# Patient Record
(demographics unavailable — no encounter records)

---

## 2024-10-30 NOTE — PHYSICAL EXAM
[Alert] : alert [Oriented x 3] : ~L oriented x 3 [Well Nourished] : well nourished [Conjunctiva Non-injected] : conjunctiva non-injected [No Visual Lymphadenopathy] : no visual  lymphadenopathy [No Clubbing] : no clubbing [No Edema] : no edema [No Bromhidrosis] : no bromhidrosis [No Chromhidrosis] : no chromhidrosis [Full Body Skin Exam Performed] : performed [FreeTextEntry3] : General: Alert and oriented, in NAD.  All of the following were examined and were within normal limits, except as noted:   Scalp: Face: Neck: Chest/Back/Abdomen: Arms/Hands: Legs/Feet: Buttocks, Genitalia:  	 Hair, Nails, Oral Mucosa, Eyes:   Lymph Nodes:  - on the right zygomatic cheek, there is a well healed surgical scar. No evidence of repigmentation, nodularity, or recurrence - few yellow papules on the forehead and cheeks with excessive oily skin - stuck-on waxy brown papules on chest and abdomen - red papules on body, including R chest - numerous brown to dark brown macules and few papules on torso and extremities  - well healed biopsy scar on left mid-back without recurrence - cystic nodules with central punctum on R upper back x1 and R lateral breast x2 - irregular soft SQ deep nodule on R inferior breast, noted 7:00 position under areola.  - soft tender deep SQ nodule on R. medial breast 3:00 position

## 2024-10-30 NOTE — ASSESSMENT
[FreeTextEntry1] : # Neoplasm of uncertain behavior, R inferior breast (at edge of areola) - present since atleast 5/2024 exam # New lesion R. medial breast, which is possible hematoma i/s/o recent trauma vs other - Recommend further workup with mammogram/ultrasound imaging. We had prior referred to Dr. Zavala; will reach out via email to confirm to areas of concern are different from EIC x2 on R lateral breast  # EIC, R upper back x1, R lateral breast x1, R inferolateral breast x1 - Benign condition discussed with patient - No further treatment indicated at this time  # History of melanoma (Invasive LMM, right zygomatic cheek, s/p Mohs excision 9/2019)  - No evidence of recurrence or repigmentation, no LAD - Denies fevers, chills, weight loss - Recommend to f/u with ophthalmology, dentist and OB-Gyn annually - Discussed mole monitoring and sun protective measures - RTC: 4-6 months   # Sebaceous hyperplasia, face - Benign condition discussed with patient - No further treatment indicated at this time  # Seborrheic Keratosis - These growths are benign - Related to genetics - these lesions run in families; NOT related to sun exposure - No treatment warranted unless inflamed; can use OTC Sarna lotion PRN itch  # Multiple melanocytic nevi, benign  - Monitor moles for ABCDEs - Recommend wearing hats and sun protective clothing or sunscreen (SPF 30+, broad band) daily on your face and entire body (apply sunscreen atleast 30 minutes prior to going outside). Reapply sunscreen every 2 hours when outside.  # Angioma - Benign condition discussed with patient - No further treatment indicated at this time  # Screening exam for skin cancer - TBSE performed today - Advised sun protection, SPF 30 or higher daily and reapply often, sun protective clothing - Counseled patient to monitor for changes, including ABCDEs of mole monitoring - Discussed self-skin exams  # Acne, mild on face - chronic; stable though pt c/o oily skin - Continue OTC BP 3-5% wash daily to face  # Furunculosis - chronic; stable Prev involved bl inframammary folds - Continue antibacterial Hibiclens (or Dial soap) wash daily to torso  # Stasis dermatitis, b/l lower legs - mostly PIH on exam today; chronic; stable - Diagnosis discussed, chronic problem due to decreased blood flow in lower legs  - Recommend compression stockings (knee high) with 15-20 mmHg compression daily - Keep legs elevated when able - Dry skin care  # Wound with crusted erosion, R elbow - Mupirocin ointment BID until healed  RTC 4-6 months

## 2024-10-30 NOTE — HISTORY OF PRESENT ILLNESS
[FreeTextEntry1] : FUV spots; hx of melanoma [de-identified] : Ms. LISA CARAMICO is a 61-year-old F with hx of anxiety and endometrial carcinoma with mets to lymph node (s/p surgery, chemo/radiation in 2/2019) here for followup of below.  #Bump on R inferior breast, noticed around June 2023. Pt saw Dr. Zavala about it, who is monitoring and recommended further imaging.  - 5/1/2024 at  had noted new lump inferior to R areola, referred to Dr. Zavala for eval, but per records known EIC's were evaluated. Patient has since had a fall recently, impacted anterior chest, not with pain in the breast and chest area  #Of note pt notes hx of painful bumps on bl inframammary folds for weeks prior to  that improved with levofloxacin. Also used mupirocin ointment. She notes that she works out a lot and wears tight clothing - 1/7/21: Clear today; using hibiclens wash and mupirocin ointment to nares. Still with scarring.  - 5/2024: stable with hibiclens  #Spots scattered on body x years. Asymptomatic and unchanged. No alleviating/aggravating factors. Never been treated.  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

## 2024-11-14 NOTE — ASSESSMENT
[FreeTextEntry1] : 62-year-old lady with a history of endometrial cancer, stage I right cheek melanoma, and dysplastic nevus of her left back, referred for the evaluation and management of a visible and palpable abnormality in her right breast (8:00) in 2023.  My exam and her imaging suggested an epidermal inclusion cyst.  She presents today because October 30, 2024, a routine dermatologic examination, they felt a possible abnormality in the right breast, 6:00, periareolar. Patient is asymptomatic with a normal BSE. Identified no worrisome findings on physical examination today.  I suggested a diagnostic right mammogram and sonogram presently. She understands and agrees. Prescription provided for .  I have asked her to call me a week after the imaging to discuss the results. She presents for follow-up breast examination today.   Spring 2024 Bilateral mammography and right breast ultrasound at : [facility]. Prescription verified for April 2025.  Presently she appears to be doing well.  I have offered to continue to see her annually, sooner if needed.  Reviewed in detail, all questions answered.

## 2024-11-14 NOTE — PHYSICAL EXAM
[Normal] : supple, no neck mass and thyroid not enlarged [Normal Neck Lymph Nodes] : normal neck lymph nodes  [Normal Supraclavicular Lymph Nodes] : normal supraclavicular lymph nodes [Normal Axillary Lymph Nodes] : normal axillary lymph nodes [Normal] : full range of motion and no deformities appreciated [de-identified] : Groins not examined [de-identified] : Below [de-identified] : Below

## 2024-11-14 NOTE — REASON FOR VISIT
[Other: _____] : [unfilled] [FreeTextEntry2] : Presents for evaluation of a possible palpable abnormality in her right breast, periareolar at 6:00, identified by the dermatologist on skin examination October 30, 2024.

## 2024-11-14 NOTE — HISTORY OF PRESENT ILLNESS
[de-identified] :  62-year-old lady.  Interval breast examination.  CC: She is asymptomatic. 10/30/2024: On schedule dermatology follow-up, they raise a concern regarding a possible palpable abnormality in the right breast, abel-areolar, at 6:00. The patient has a normal BSE. She has no signs or symptoms related to either breast.  No other specific or constitutional signs or symptoms.  + History of epidermal inclusion cyst of the right breast (~8:00, 6 cm FN).   + History of ENDOMETRIAL CANCER (below), and CHEEK MELANOMA (below).  + Prior personal history: 1. 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 2019: Treated with Mohs procedure.  2.  Left mid back dysplastic nevus, excised by dermatology.  3.  2018: CAROLINA/BSO, lymphadenectomy, and omentectomy for ENDOMETRIAL CANCER (stage III C1). + Chemotherapy (carboplatinum and Taxol, completed 2018 (Dr. Temitope PALOMINO). + XRT: Dr. Maia ACUÑA. Complex abdominal closure by Dr. Evaristo MATOS.  Dermatology: Dr. Kya BLANKENSHIP. Visits are current and up to date.  She had a previous exploratory laparotomy for an SBO after  section by Dr. Anish ANTUNEZ.   No family history of breast cancer. No relatives with ovarian cancer.  Not Ashkenazi.  + FH: Sister: Cancer of the cervix. A brother had prostate cancer.   PMD: Dr. Mckenzie SALCEDO.  +ALLERGIC. Ampicillin. Amoxicillin.  No pacemaker or defibrillator. No anticoagulants.  2024: Diagnosed with hypertension by her internist. Saw cardiology: Dr. Maty DUNBAR. Treatment with labetalol initiated.  She has IBS-like symptoms. Currently not requiring medical treatment  2024 she was diagnosed with peptic ulcer disease. Being treated with pantoprazole. She had an EGD performed by Dr. Aleisha DE LOS SANTOS to evaluate postprandial burning pain.   She takes Systane eyedrops for dry eyes. Her ophthalmologist is at [facility].  + Osteoarthritis. History of bilateral knee replacement.   GYN oncology: Dr. Huang RYAN, last visit was 2023. GYN: Dr. Angelica WRIGHT. 2025 follow-up as scheduled.   Spring 2024: Colonoscopy by Dr. Aleisha DE LOS SANTOS was normal. Before that her gastroenterologist was Dr. Antoine Dong.

## 2024-11-14 NOTE — REVIEW OF SYSTEMS
[Negative] : Heme/Lymph [FreeTextEntry3] :  dry eyes [FreeTextEntry5] : Hypertension [FreeTextEntry7] : Allergies listed above [FreeTextEntry8] : History of bowel obstruction [de-identified] : OA [de-identified] : History of melanoma [FreeTextEntry1] : History of endometrial cancer

## 2025-03-28 NOTE — REVIEW OF SYSTEMS
[de-identified] : See HPI [Negative] : Cardiovascular [FreeTextEntry1] : See HPI [de-identified] : See HPI

## 2025-03-28 NOTE — HISTORY OF PRESENT ILLNESS
[de-identified] : She was in the hospital for sepsis and left second toe amputation. She was treated for osteomyelitis. She has fatigue otherwise feels well. She currently is not on any antibiotics. GI symptoms are resolved.

## 2025-03-28 NOTE — DISCUSSION/SUMMARY
[Reviewed Clinical Lab Test(s)] : Results of clinical tests were reviewed. [Reviewed Radiology Report(s)] : Radiology reports were reviewed. [FreeTextEntry1] : She is clinically RASHI.   -We discussed her care plan. We reviewed the recommended surveillance.  Await the markers; VJ plans scans as further indicated.  -We have discussed BHM, and she confirms she'll see MB in f/u.  -We have discussed the bone density and her instructions for Ca, Vit D, and exercise as tolerated. Still await the f/u study.  -Her instructions were reviewed.  -All Q/A. -She will RTO in 6m in [facility], sooner prn.  -Effort for the visit includes the note prep, review of prior material, interview, exam, further documentation, and coordination of care.

## 2025-03-28 NOTE — REVIEW OF SYSTEMS
Normal rate, regular rhythm, normal S1, S2 heart sounds heard. [Diarrhea: Grade 0] : Diarrhea: Grade 0 [Negative] : Allergic/Immunologic

## 2025-03-28 NOTE — DISCUSSION/SUMMARY
[Reviewed Clinical Lab Test(s)] : Results of clinical tests were reviewed. [Reviewed Radiology Report(s)] : Radiology reports were reviewed. [FreeTextEntry1] : She is clinically RASHI.   -We discussed her care plan. We reviewed the recommended surveillance.  Await the markers; VJ plans scans as further indicated.  -We have discussed BHM, and she confirms she'll see MB in f/u.  -We have discussed the bone density and her instructions for Ca, Vit D, and exercise as tolerated. Still await the f/u study.  -Her instructions were reviewed.  -All Q/A. -She will RTO in 6m in [facility], sooner prn.  -Effort for the visit includes the note prep, review of prior material, interview, exam, further documentation, and coordination of care.

## 2025-03-28 NOTE — PHYSICAL EXAM
[Chaperone Present] : A chaperone was present in the examining room during all aspects of the physical examination [Abnormal] : Examination for hernias: Abnormal [Absent] : Adnexa(ae): Absent [de-identified] : Fullness on left abd; NT [FreeTextEntry2] : Magy [Normal] : Examination for hernias: No hernia appreciated [de-identified] : [facility] [de-identified] : Trace edema [de-identified] : atrophy

## 2025-03-28 NOTE — REVIEW OF SYSTEMS
[de-identified] : See HPI [Negative] : Cardiovascular [FreeTextEntry1] : See HPI [de-identified] : See HPI

## 2025-03-28 NOTE — PHYSICAL EXAM
[Chaperone Present] : A chaperone was present in the examining room during all aspects of the physical examination [Abnormal] : Examination for hernias: Abnormal [Absent] : Adnexa(ae): Absent [de-identified] : Fullness on left abd; NT [FreeTextEntry2] : Magy [Normal] : Examination for hernias: No hernia appreciated [de-identified] : [facility] [de-identified] : Trace edema [de-identified] : atrophy

## 2025-03-28 NOTE — HISTORY OF PRESENT ILLNESS
[FreeTextEntry1] : Stage IIIC1 Grade 2 EM Ca CAROLINA, BSO, LNS, Oment (with Hernia Repair) - 7/5/18 MMR Protein loss in MLH1 and PMS 2.  Adjuvant Chemo Adjuvant Radiation Referring MD/GYN- Dr. Ford (Gyn) PCP- Dr. Mckenzie Weaver GI-Dr NANCY Dong, recent saw Dr CLAUDE Velez Cards: ? Name Surg: Dr XANDER Mcnamara (was Dr Brooks) Med Onc; Dr HALLIE Judd Rad Onc: Dr Quiroz (was Dr SOTERO Monroe)  Treatment: Carboplatin and Taxol, C1- 8/27/18 --- C3 -10/26/18 EBRT - completed 12/17/18.   She RTO feeling well and noting no VB, VD, or pain. Amp r/t osteo, now ok she reports; ? etiol.   ROS: She reports no  or GI concerns.    Disc her M eval with MB; she is to see him again.   Markers reviewed in RESULTS.   CT: CAP - RASHI, breast changes.    Health Maintenance: Colonoscopy- Jun 2018

## 2025-04-09 NOTE — HISTORY OF PRESENT ILLNESS
[FreeTextEntry1] : FUV spots; hx of melanoma [de-identified] : Ms. LISA CARAMICO is a 62-year-old F with hx of anxiety and endometrial carcinoma with mets to lymph node (s/p surgery, chemo/radiation in 2/2019) here for followup of below.  #Bump on R inferior breast, noticed around June 2023. Pt saw Dr. Zavala about it, who is monitoring and recommended further imaging.  - 5/1/2024 at  had noted new lump inferior to R areola, referred to Dr. Zavala for eval, but per records known EIC's were evaluated. Patient has since had a fall recently, impacted anterior chest, not with pain in the breast and chest area - 4/9/2025 patient had mammogram 12/2024 which showed benign findings and a stable skin lesion on the right upper out breast.   #Of note pt notes hx of painful bumps on bl inframammary folds for weeks prior to  that improved with levofloxacin. Also used mupirocin ointment. She notes that she works out a lot and wears tight clothing - 1/7/21: Clear today; using hibiclens wash and mupirocin ointment to nares. Still with scarring.   - 4/2025: stable with hibiclens   #Toenail fungus on her the toenails of her L foot, has been using ciclopirox lacquer since Feb 2025. Also had her second L toe amputated from an infection of unknown origin.   #Spots scattered on body x years. Asymptomatic and unchanged. No alleviating/aggravating factors. Never been treated.  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

## 2025-04-09 NOTE — END OF VISIT
[] : Resident [Time Spent: ___ minutes] : I have spent [unfilled] minutes of time on the encounter which excludes teaching and separately reported services. [Resident] : Resident

## 2025-04-09 NOTE — ASSESSMENT
[FreeTextEntry1] : # Neoplasm of uncertain behavior of skin, right cheek - r/o BCC vs. dysplastic nevus vs. congenital melanocytic nevus > melanoma - shave tangential bx performed After informed consent was obtained, using Hibiclens for cleansing and 1% Lidocaine with epinephrine for anesthetic, with sterile technique a biopsy was used to obtain a sample of the lesion. Hemostasis was obtained with aluminum chloride. Vaseline was applied, and wound care instructions provided. The specimen was labeled and sent to pathology for evaluation. The procedure was well tolerated without complication. - Will call patient with results of biopsy   # Neoplasm of uncertain behavior, R inferior breast (at edge of areola) - present since atleast 5/2024 exam - Patient was evaluated by Dr. Zavala who is favoring a benign process - 12/2024 mammograph with benign findings and a stable skin lesion on the right upper outer breast.   # EIC, R upper back x1, R lateral breast x1, R inferolateral breast x1 - Benign condition discussed with patient - No further treatment indicated at this time  # History of melanoma (Invasive LMM, right zygomatic cheek, s/p Mohs excision 9/2019)  - No evidence of recurrence or repigmentation, no LAD - Denies fevers, chills, weight loss - Recommend to f/u with ophthalmology, dentist and OB-Gyn annually - Discussed mole monitoring and sun protective measures - RTC: 4-6 months   # Sebaceous hyperplasia, face # Seborrheic Keratosis # Multiple melanocytic nevi, benign  # Screening exam for skin cancer - TBSE performed today - Advised sun protection, SPF 30 or higher daily and reapply often, sun protective clothing - Counseled patient to monitor for changes, including ABCDEs of mole monitoring - Discussed self-skin exams  # Acne, mild on face - chronic; stable though pt c/o oily skin - Continue OTC BP 3-5% wash daily to face  # Furunculosis - chronic; stable Prev involved bl inframammary folds - Continue antibacterial Hibiclens (or Dial soap) wash daily to torso  # Onychomycosis, toenails - chronic; flaring - continue ciclopirox laquer per patient's surgeon - Not a candidate for oral terbinafine d/t fatty liver   RTC 4-6 months

## 2025-04-09 NOTE — PHYSICAL EXAM
[Alert] : alert [Oriented x 3] : ~L oriented x 3 [Well Nourished] : well nourished [Conjunctiva Non-injected] : conjunctiva non-injected [No Visual Lymphadenopathy] : no visual  lymphadenopathy [No Clubbing] : no clubbing [No Edema] : no edema [No Bromhidrosis] : no bromhidrosis [No Chromhidrosis] : no chromhidrosis [Full Body Skin Exam Performed] : performed [FreeTextEntry3] : General: Alert and oriented, in NAD.  All of the following were examined and were within normal limits, except as noted:   Scalp: Face: Neck: Chest/Back/Abdomen: Arms/Hands: Legs/Feet: Buttocks, Genitalia:  	 Hair, Nails, Oral Mucosa, Eyes:   Lymph Nodes:  - on the right zygomatic cheek, there is a well healed surgical scar. No evidence of repigmentation, nodularity, or recurrence - brown papule with irregular globules on the right malar cheek  - few yellow papules on the forehead and cheeks with excessive oily skin - stuck-on waxy brown papules on chest and abdomen - numerous brown to dark brown macules and few papules on torso and extremities  - well healed biopsy scar on left mid-back without recurrence - cystic nodules with central punctum on R upper back x1 and R lateral breast x2 - irregular soft SQ deep nodule on R inferior breast, noted 7:00 position under areola.  - hyperkeratotic, yellow, dystrophic toenails of the first and third toenails on the left foot  - truncated second left toe